# Patient Record
Sex: FEMALE | Race: WHITE | NOT HISPANIC OR LATINO | Employment: OTHER | URBAN - NONMETROPOLITAN AREA
[De-identification: names, ages, dates, MRNs, and addresses within clinical notes are randomized per-mention and may not be internally consistent; named-entity substitution may affect disease eponyms.]

---

## 2023-09-05 PROBLEM — R55 SYNCOPE: Status: ACTIVE | Noted: 2023-09-05

## 2023-09-05 PROBLEM — Z79.899 HIGH RISK MEDICATION USE: Status: ACTIVE | Noted: 2023-09-05

## 2023-09-05 PROBLEM — I48.20 CHRONIC ATRIAL FIBRILLATION (MULTI): Status: ACTIVE | Noted: 2023-09-05

## 2023-09-05 PROBLEM — J44.9 COPD (CHRONIC OBSTRUCTIVE PULMONARY DISEASE) (MULTI): Status: ACTIVE | Noted: 2023-09-05

## 2023-09-05 PROBLEM — Z79.01 ANTICOAGULATED: Status: ACTIVE | Noted: 2023-09-05

## 2023-09-05 PROBLEM — I47.20 PAROXYSMAL VENTRICULAR TACHYCARDIA: Status: ACTIVE | Noted: 2023-09-05

## 2023-09-05 PROBLEM — I10 ESSENTIAL HYPERTENSION: Status: ACTIVE | Noted: 2023-09-05

## 2023-09-05 PROBLEM — I47.29 PAROXYSMAL VENTRICULAR TACHYCARDIA (MULTI): Status: ACTIVE | Noted: 2023-09-05

## 2023-09-05 PROBLEM — Z87.891 FORMER SMOKER: Status: ACTIVE | Noted: 2023-09-05

## 2023-09-05 RX ORDER — DORZOLAMIDE HYDROCHLORIDE AND TIMOLOL MALEATE 20; 5 MG/ML; MG/ML
1 SOLUTION/ DROPS OPHTHALMIC 2 TIMES DAILY
COMMUNITY
Start: 2021-09-15

## 2023-09-05 RX ORDER — PREDNISOLONE ACETATE 10 MG/ML
SUSPENSION/ DROPS OPHTHALMIC
COMMUNITY
Start: 2023-03-22

## 2023-09-05 RX ORDER — METOPROLOL SUCCINATE 25 MG/1
1 TABLET, EXTENDED RELEASE ORAL DAILY
COMMUNITY
End: 2023-12-22 | Stop reason: SDUPTHER

## 2023-09-05 RX ORDER — WARFARIN 4 MG/1
4 TABLET ORAL
COMMUNITY
Start: 2023-04-22

## 2023-09-05 RX ORDER — BUDESONIDE 0.25 MG/2ML
0.25 INHALANT ORAL 2 TIMES DAILY
COMMUNITY
Start: 2022-02-18

## 2023-09-05 RX ORDER — IPRATROPIUM BROMIDE AND ALBUTEROL SULFATE 2.5; .5 MG/3ML; MG/3ML
3 SOLUTION RESPIRATORY (INHALATION) 3 TIMES DAILY
COMMUNITY
Start: 2022-02-18 | End: 2023-11-06

## 2023-09-05 RX ORDER — DENOSUMAB 60 MG/ML
INJECTION SUBCUTANEOUS
COMMUNITY

## 2023-09-05 RX ORDER — NITROGLYCERIN 0.4 MG/1
0.4 TABLET SUBLINGUAL EVERY 5 MIN PRN
COMMUNITY

## 2023-09-05 RX ORDER — AMLODIPINE BESYLATE 5 MG/1
5 TABLET ORAL
COMMUNITY
Start: 2023-01-31 | End: 2024-01-22 | Stop reason: SDUPTHER

## 2023-09-05 RX ORDER — MONTELUKAST SODIUM 10 MG/1
1 TABLET ORAL DAILY
COMMUNITY

## 2023-09-05 RX ORDER — ALBUTEROL SULFATE 0.83 MG/ML
2.5 SOLUTION RESPIRATORY (INHALATION) EVERY 6 HOURS PRN
COMMUNITY
Start: 2022-01-25

## 2023-09-05 RX ORDER — DIGOXIN 125 MCG
1 TABLET ORAL DAILY
COMMUNITY
End: 2023-11-15 | Stop reason: SDUPTHER

## 2023-09-05 RX ORDER — PREDNISONE 5 MG/1
1 TABLET ORAL DAILY
COMMUNITY
Start: 2021-12-16

## 2023-09-05 RX ORDER — VALSARTAN 80 MG/1
1 TABLET ORAL 2 TIMES DAILY
COMMUNITY
Start: 2021-07-28 | End: 2024-01-09 | Stop reason: SDUPTHER

## 2023-09-05 RX ORDER — CETIRIZINE HYDROCHLORIDE 10 MG/1
1 TABLET ORAL DAILY PRN
COMMUNITY

## 2023-11-06 ENCOUNTER — OFFICE VISIT (OUTPATIENT)
Dept: CARDIOLOGY | Facility: CLINIC | Age: 78
End: 2023-11-06
Payer: MEDICARE

## 2023-11-06 VITALS
HEART RATE: 88 BPM | SYSTOLIC BLOOD PRESSURE: 110 MMHG | HEIGHT: 68 IN | DIASTOLIC BLOOD PRESSURE: 80 MMHG | BODY MASS INDEX: 27.74 KG/M2 | WEIGHT: 183 LBS

## 2023-11-06 DIAGNOSIS — I48.20 CHRONIC ATRIAL FIBRILLATION (MULTI): ICD-10-CM

## 2023-11-06 DIAGNOSIS — J44.9 CHRONIC OBSTRUCTIVE PULMONARY DISEASE, UNSPECIFIED COPD TYPE (MULTI): ICD-10-CM

## 2023-11-06 DIAGNOSIS — I47.29 PAROXYSMAL VENTRICULAR TACHYCARDIA (MULTI): ICD-10-CM

## 2023-11-06 DIAGNOSIS — Z79.899 HIGH RISK MEDICATION USE: ICD-10-CM

## 2023-11-06 DIAGNOSIS — I10 ESSENTIAL HYPERTENSION: ICD-10-CM

## 2023-11-06 DIAGNOSIS — Z79.01 ANTICOAGULATED: ICD-10-CM

## 2023-11-06 PROCEDURE — 3079F DIAST BP 80-89 MM HG: CPT | Performed by: INTERNAL MEDICINE

## 2023-11-06 PROCEDURE — 3074F SYST BP LT 130 MM HG: CPT | Performed by: INTERNAL MEDICINE

## 2023-11-06 PROCEDURE — 1159F MED LIST DOCD IN RCRD: CPT | Performed by: INTERNAL MEDICINE

## 2023-11-06 PROCEDURE — 99214 OFFICE O/P EST MOD 30 MIN: CPT | Performed by: INTERNAL MEDICINE

## 2023-11-06 PROCEDURE — 1036F TOBACCO NON-USER: CPT | Performed by: INTERNAL MEDICINE

## 2023-11-06 RX ORDER — WARFARIN 1 MG/1
1 TABLET ORAL DAILY
COMMUNITY

## 2023-11-06 ASSESSMENT — ENCOUNTER SYMPTOMS: SHORTNESS OF BREATH: 1

## 2023-11-06 NOTE — PROGRESS NOTES
"Subjective   Debra Alicea is a 78 y.o. female       Chief Complaint    Follow-up          HPI   Patient is in the office for follow-up for the problems noted below.  She is doing great and the pressure finally is under great control with no side effect of medications.  She has chronic atrial fibrillation with controlled rate which remains asymptomatic and she is chronically anticoagulated without any bleeding problems.  There has been no syncope or presyncope.  She maintains active lifestyle and follows with her PCP on regular basis.  Her examination was only remarkable for irregular rhythm.    Assessment/recommendation:     1-history of syncope leading to injury, patient declined invasive evaluation with no recurrences. This happened over 3 year ago. Patient has no indication of organic heart disease.  2-hypertension currently under excellent control on medical regimen that she has tolerated fairly well  3-permanent atrial fibrillation managed with rate control and anticoagulation with warfarin, no thromboembolic events and no bleeding complications  4-high-risk medication with anticoagulants with no bleeding complications  5-mild COPD on medical therapy, patient follow-up with pulmonary medicine at the [facility]     Review of Systems   Respiratory:  Positive for shortness of breath.    All other systems reviewed and are negative.         Visit Vitals  /80 (BP Location: Left arm, Patient Position: Sitting)   Pulse 88   Ht 1.727 m (5' 8\")   Wt 83 kg (183 lb)   BMI 27.83 kg/m²   Smoking Status Former   BSA 2 m²        Objective   Physical Exam  Constitutional:       Appearance: Normal appearance. She is normal weight.   HENT:      Nose: Nose normal.   Neck:      Vascular: No carotid bruit.   Cardiovascular:      Rate and Rhythm: Normal rate. Rhythm irregular.      Pulses: Normal pulses.      Heart sounds: Normal heart sounds.   Pulmonary:      Effort: Pulmonary effort is normal.   Abdominal:      " General: Bowel sounds are normal.      Palpations: Abdomen is soft.   Genitourinary:     Rectum: Normal.   Musculoskeletal:         General: Normal range of motion.      Cervical back: Normal range of motion.      Right lower leg: No edema.      Left lower leg: No edema.   Skin:     General: Skin is warm and dry.   Neurological:      General: No focal deficit present.      Mental Status: She is alert.   Psychiatric:         Mood and Affect: Mood normal.         Behavior: Behavior normal.         Thought Content: Thought content normal.         Judgment: Judgment normal.         Current Medications    Current Outpatient Medications:     albuterol 2.5 mg /3 mL (0.083 %) nebulizer solution, Inhale 3 mL (2.5 mg) every 6 hours if needed., Disp: , Rfl:     amLODIPine (Norvasc) 5 mg tablet, 1 tablet (5 mg)., Disp: , Rfl:     budesonide (Pulmicort) 0.25 mg/2 mL nebulizer solution, Inhale 2 mL (0.25 mg) 2 times a day., Disp: , Rfl:     cetirizine (ZyrTEC) 10 mg tablet, Take 1 tablet (10 mg) by mouth once daily as needed., Disp: , Rfl:     denosumab (Prolia) 60 mg/mL syringe, Inject under the skin every 6 months., Disp: , Rfl:     digoxin (Lanoxin) 125 MCG tablet, Take 1 tablet (125 mcg) by mouth once daily., Disp: , Rfl:     dorzolamide-timoloL (Cosopt) 22.3-6.8 mg/mL ophthalmic solution, Administer 1 drop into the right eye 2 times a day., Disp: , Rfl:     metoprolol succinate XL (Toprol-XL) 25 mg 24 hr tablet, Take 1 tablet (25 mg) by mouth once daily., Disp: , Rfl:     montelukast (Singulair) 10 mg tablet, Take 1 tablet (10 mg) by mouth once daily., Disp: , Rfl:     nitroglycerin (Nitrostat) 0.4 mg SL tablet, Place 1 tablet (0.4 mg) under the tongue every 5 minutes if needed for chest pain., Disp: , Rfl:     prednisoLONE acetate (Pred-Forte) 1 % ophthalmic suspension, , Disp: , Rfl:     predniSONE (Deltasone) 5 mg tablet, Take 1 tablet (5 mg) by mouth once daily., Disp: , Rfl:     valsartan (Diovan) 80 mg tablet, Take 1  tablet (80 mg) by mouth 2 times a day., Disp: , Rfl:     warfarin (Coumadin) 1 mg tablet, Take 1 tablet (1 mg) by mouth once daily. Take as directed per After Visit Summary., Disp: , Rfl:     warfarin (Coumadin) 4 mg tablet, 1 tablet (4 mg)., Disp: , Rfl:                      Assessment/Plan   1. Chronic atrial fibrillation (CMS/HCC)  Basic Metabolic Panel    CBC    Follow Up In Cardiology      2. High risk medication use  Basic Metabolic Panel    CBC      3. Essential hypertension        4. Paroxysmal ventricular tachycardia (CMS/HCC)        5. Anticoagulated        6. Chronic obstructive pulmonary disease, unspecified COPD type (CMS/HCC)

## 2023-11-15 DIAGNOSIS — I47.29 PAROXYSMAL VENTRICULAR TACHYCARDIA (MULTI): ICD-10-CM

## 2023-11-15 RX ORDER — DIGOXIN 125 MCG
125 TABLET ORAL DAILY
Qty: 90 TABLET | Refills: 3 | Status: SHIPPED | OUTPATIENT
Start: 2023-11-15 | End: 2024-11-14

## 2023-12-22 DIAGNOSIS — I47.29 PAROXYSMAL VENTRICULAR TACHYCARDIA (MULTI): ICD-10-CM

## 2023-12-22 DIAGNOSIS — I10 ESSENTIAL HYPERTENSION: ICD-10-CM

## 2023-12-22 RX ORDER — METOPROLOL SUCCINATE 25 MG/1
25 TABLET, EXTENDED RELEASE ORAL DAILY
Qty: 90 TABLET | Refills: 3 | Status: SHIPPED | OUTPATIENT
Start: 2023-12-22 | End: 2024-12-21

## 2024-01-09 DIAGNOSIS — I10 ESSENTIAL HYPERTENSION: ICD-10-CM

## 2024-01-09 RX ORDER — VALSARTAN 80 MG/1
80 TABLET ORAL 2 TIMES DAILY
Qty: 180 TABLET | Refills: 3 | Status: SHIPPED | OUTPATIENT
Start: 2024-01-09

## 2024-01-22 DIAGNOSIS — I10 ESSENTIAL HYPERTENSION: ICD-10-CM

## 2024-01-22 RX ORDER — AMLODIPINE BESYLATE 5 MG/1
5 TABLET ORAL DAILY
Qty: 90 TABLET | Refills: 3 | Status: SHIPPED | OUTPATIENT
Start: 2024-01-22 | End: 2025-01-21

## 2024-05-07 ENCOUNTER — OFFICE VISIT (OUTPATIENT)
Dept: CARDIOLOGY | Facility: CLINIC | Age: 79
End: 2024-05-07
Payer: MEDICARE

## 2024-05-07 VITALS
WEIGHT: 143 LBS | SYSTOLIC BLOOD PRESSURE: 132 MMHG | HEIGHT: 68 IN | BODY MASS INDEX: 21.67 KG/M2 | HEART RATE: 74 BPM | DIASTOLIC BLOOD PRESSURE: 86 MMHG

## 2024-05-07 DIAGNOSIS — I10 ESSENTIAL HYPERTENSION: ICD-10-CM

## 2024-05-07 DIAGNOSIS — Z87.891 FORMER SMOKER: ICD-10-CM

## 2024-05-07 DIAGNOSIS — J44.9 CHRONIC OBSTRUCTIVE PULMONARY DISEASE, UNSPECIFIED COPD TYPE (MULTI): ICD-10-CM

## 2024-05-07 DIAGNOSIS — Z79.899 HIGH RISK MEDICATION USE: ICD-10-CM

## 2024-05-07 DIAGNOSIS — Z79.01 ANTICOAGULATED: ICD-10-CM

## 2024-05-07 DIAGNOSIS — I48.20 CHRONIC ATRIAL FIBRILLATION (MULTI): ICD-10-CM

## 2024-05-07 DIAGNOSIS — I47.29 PAROXYSMAL VENTRICULAR TACHYCARDIA (MULTI): ICD-10-CM

## 2024-05-07 PROCEDURE — 1159F MED LIST DOCD IN RCRD: CPT | Performed by: INTERNAL MEDICINE

## 2024-05-07 PROCEDURE — 3079F DIAST BP 80-89 MM HG: CPT | Performed by: INTERNAL MEDICINE

## 2024-05-07 PROCEDURE — 1036F TOBACCO NON-USER: CPT | Performed by: INTERNAL MEDICINE

## 2024-05-07 PROCEDURE — 99214 OFFICE O/P EST MOD 30 MIN: CPT | Performed by: INTERNAL MEDICINE

## 2024-05-07 PROCEDURE — 3075F SYST BP GE 130 - 139MM HG: CPT | Performed by: INTERNAL MEDICINE

## 2024-05-07 RX ORDER — FERROUS SULFATE, DRIED 160(50) MG
1 TABLET, EXTENDED RELEASE ORAL 2 TIMES DAILY
COMMUNITY

## 2024-05-07 RX ORDER — TIOTROPIUM BROMIDE AND OLODATEROL 3.124; 2.736 UG/1; UG/1
2 SPRAY, METERED RESPIRATORY (INHALATION) DAILY
COMMUNITY

## 2024-05-07 RX ORDER — THIAMINE HCL 250 MG
250 TABLET ORAL DAILY
COMMUNITY

## 2024-05-07 NOTE — PROGRESS NOTES
"Subjective   Debra Alicea is a 78 y.o. female       Chief Complaint    Follow-up          HPI   Patient is in the office for follow-up for the problems noted below.  Since her last visit she lost 10 pounds on purpose and was advised not to lose any more weight.  She has maintained active lifestyle and has been highly motivated.  She sees pulmonary medicine at the [facility] for her dyspnea caused by COPD and seasonal reactive airway disease.  She had no syncope and no palpitations and no issues with her blood pressure.  Lab data available in the chart were reviewed and there was no concern noted.  Examination was remarkable for diminished breath sounds in her lungs.    Assessment/recommendation:     1-history of syncope leading to injury, patient declined invasive evaluation with no recurrences. This happened over 3 year ago. Patient has no indication of organic heart disease.  2-hypertension currently under excellent control on medical regimen that she has tolerated fairly well  3-permanent atrial fibrillation managed with rate control and anticoagulation with warfarin, no thromboembolic events and no bleeding complications  4-high-risk medication with anticoagulants with no bleeding complications  5-mild COPD on medical therapy, patient follow-up with pulmonary medicine at the [facility]  Review of Systems   All other systems reviewed and are negative.           Vitals:    05/07/24 1101   BP: 132/86   BP Location: Right arm   Patient Position: Sitting   Pulse: 74   Weight: 64.9 kg (143 lb)   Height: 1.727 m (5' 8\")        Objective   Physical Exam  Constitutional:       Appearance: Normal appearance.   HENT:      Nose: Nose normal.   Neck:      Vascular: No carotid bruit.   Cardiovascular:      Rate and Rhythm: Normal rate. Rhythm irregularly irregular.      Pulses: Normal pulses.      Heart sounds: Normal heart sounds.   Pulmonary:      Effort: Pulmonary effort is normal.      Breath sounds: " Decreased air movement present.   Abdominal:      General: Bowel sounds are normal.      Palpations: Abdomen is soft.   Musculoskeletal:         General: Normal range of motion.      Cervical back: Normal range of motion.      Right lower leg: No edema.      Left lower leg: No edema.   Skin:     General: Skin is warm and dry.   Neurological:      General: No focal deficit present.      Mental Status: She is alert.   Psychiatric:         Mood and Affect: Mood normal.         Behavior: Behavior normal.         Thought Content: Thought content normal.         Judgment: Judgment normal.         Allergies  Patient has no known allergies.     Current Medications    Current Outpatient Medications:     albuterol 2.5 mg /3 mL (0.083 %) nebulizer solution, Inhale 3 mL (2.5 mg) every 6 hours if needed., Disp: , Rfl:     amLODIPine (Norvasc) 5 mg tablet, Take 1 tablet (5 mg) by mouth once daily., Disp: 90 tablet, Rfl: 3    budesonide (Pulmicort) 0.25 mg/2 mL nebulizer solution, Inhale 2 mL (0.25 mg) 2 times a day., Disp: , Rfl:     calcium carbonate-vitamin D3 500 mg-5 mcg (200 unit) tablet, Take 1 tablet by mouth 2 times a day., Disp: , Rfl:     cetirizine (ZyrTEC) 10 mg tablet, Take 1 tablet (10 mg) by mouth once daily as needed., Disp: , Rfl:     denosumab (Prolia) 60 mg/mL syringe, Inject under the skin every 6 months., Disp: , Rfl:     digoxin (Lanoxin) 125 MCG tablet, Take 1 tablet (125 mcg) by mouth once daily., Disp: 90 tablet, Rfl: 3    diphenhydramine HCl (BENADRYL ALLERGY ORAL), Take 1 tablet by mouth once daily at bedtime., Disp: , Rfl:     dorzolamide-timoloL (Cosopt) 22.3-6.8 mg/mL ophthalmic solution, Administer 1 drop into the right eye 2 times a day., Disp: , Rfl:     metoprolol succinate XL (Toprol-XL) 25 mg 24 hr tablet, Take 1 tablet (25 mg) by mouth once daily., Disp: 90 tablet, Rfl: 3    montelukast (Singulair) 10 mg tablet, Take 1 tablet (10 mg) by mouth once daily., Disp: , Rfl:     nitroglycerin  (Nitrostat) 0.4 mg SL tablet, Place 1 tablet (0.4 mg) under the tongue every 5 minutes if needed for chest pain., Disp: , Rfl:     prednisoLONE acetate (Pred-Forte) 1 % ophthalmic suspension, , Disp: , Rfl:     predniSONE (Deltasone) 5 mg tablet, Take 1 tablet (5 mg) by mouth once daily., Disp: , Rfl:     thiamine (Vitamin B-1) 250 mg tablet, Take 1 tablet (250 mg) by mouth once daily., Disp: , Rfl:     tiotropium-olodateroL (Stiolto Respimat) 2.5-2.5 mcg/actuation mist inhaler, Inhale 2 Inhalations once daily., Disp: , Rfl:     valsartan (Diovan) 80 mg tablet, Take 1 tablet (80 mg) by mouth 2 times a day., Disp: 180 tablet, Rfl: 3    warfarin (Coumadin) 1 mg tablet, Take 1 tablet (1 mg) by mouth once daily. As directed by [facility], Disp: , Rfl:     warfarin (Coumadin) 4 mg tablet, 1 tablet (4 mg). As directed by [facility], Disp: , Rfl:                      Assessment/Plan   1. Chronic atrial fibrillation (Multi)  Follow Up In Cardiology    Follow Up In Cardiology    CBC    Basic Metabolic Panel    CBC    Basic Metabolic Panel      2. Essential hypertension        3. Paroxysmal ventricular tachycardia (Multi)        4. High risk medication use        5. Anticoagulated        6. Chronic obstructive pulmonary disease, unspecified COPD type (Multi)        7. Former smoker                 Scribe Attestation  By signing my name below, Siri AHUJA LPN, Scribe   attest that this documentation has been prepared under the direction and in the presence of Joe Hobbs MD.     Provider Attestation - Scribe documentation    All medical record entries made by the Scribe were at my direction and personally dictated by me. I have reviewed the chart and agree that the record accurately reflects my personal performance of the history, physical exam, discussion and plan.

## 2024-05-07 NOTE — PATIENT INSTRUCTIONS
Please bring all medicines, vitamins, and herbal supplements with you when you come to the office.    Prescriptions will not be filled unless you are compliant with your follow up appointments or have a follow up appointment scheduled as per instruction of your physician. Refills should be requested at the time of your visit.     Lab work  6 month follow up

## 2024-05-07 NOTE — LETTER
May 7, 2024     Jessica Smyth, APRN-CNP  [address] [facility] [address]    Patient: Debra Alicea   YOB: 1945   Date of Visit: 5/7/2024       Dear Dr. Jessica Smyth, APRN-CNP:    Thank you for referring Debra Alicea to me for evaluation. Below are my notes for this consultation.  If you have questions, please do not hesitate to call me. I look forward to following your patient along with you.       Sincerely,     Joe Hobbs MD      CC: No Recipients  ______________________________________________________________________________________    Subjective   Debra Alicea is a 78 y.o. female       Chief Complaint    Follow-up          HPI   Patient is in the office for follow-up for the problems noted below.  Since her last visit she lost 10 pounds on purpose and was advised not to lose any more weight.  She has maintained active lifestyle and has been highly motivated.  She sees pulmonary medicine at the [facility] for her dyspnea caused by COPD and seasonal reactive airway disease.  She had no syncope and no palpitations and no issues with her blood pressure.  Lab data available in the chart were reviewed and there was no concern noted.  Examination was remarkable for diminished breath sounds in her lungs.    Assessment/recommendation:     1-history of syncope leading to injury, patient declined invasive evaluation with no recurrences. This happened over 3 year ago. Patient has no indication of organic heart disease.  2-hypertension currently under excellent control on medical regimen that she has tolerated fairly well  3-permanent atrial fibrillation managed with rate control and anticoagulation with warfarin, no thromboembolic events and no bleeding complications  4-high-risk medication with anticoagulants with no bleeding complications  5-mild COPD on medical therapy, patient follow-up with pulmonary medicine at the [facility]  Review of Systems   All other  "systems reviewed and are negative.           Vitals:    05/07/24 1101   BP: 132/86   BP Location: Right arm   Patient Position: Sitting   Pulse: 74   Weight: 64.9 kg (143 lb)   Height: 1.727 m (5' 8\")        Objective   Physical Exam  Constitutional:       Appearance: Normal appearance.   HENT:      Nose: Nose normal.   Neck:      Vascular: No carotid bruit.   Cardiovascular:      Rate and Rhythm: Normal rate. Rhythm irregularly irregular.      Pulses: Normal pulses.      Heart sounds: Normal heart sounds.   Pulmonary:      Effort: Pulmonary effort is normal.      Breath sounds: Decreased air movement present.   Abdominal:      General: Bowel sounds are normal.      Palpations: Abdomen is soft.   Musculoskeletal:         General: Normal range of motion.      Cervical back: Normal range of motion.      Right lower leg: No edema.      Left lower leg: No edema.   Skin:     General: Skin is warm and dry.   Neurological:      General: No focal deficit present.      Mental Status: She is alert.   Psychiatric:         Mood and Affect: Mood normal.         Behavior: Behavior normal.         Thought Content: Thought content normal.         Judgment: Judgment normal.         Allergies  Patient has no known allergies.     Current Medications    Current Outpatient Medications:   •  albuterol 2.5 mg /3 mL (0.083 %) nebulizer solution, Inhale 3 mL (2.5 mg) every 6 hours if needed., Disp: , Rfl:   •  amLODIPine (Norvasc) 5 mg tablet, Take 1 tablet (5 mg) by mouth once daily., Disp: 90 tablet, Rfl: 3  •  budesonide (Pulmicort) 0.25 mg/2 mL nebulizer solution, Inhale 2 mL (0.25 mg) 2 times a day., Disp: , Rfl:   •  calcium carbonate-vitamin D3 500 mg-5 mcg (200 unit) tablet, Take 1 tablet by mouth 2 times a day., Disp: , Rfl:   •  cetirizine (ZyrTEC) 10 mg tablet, Take 1 tablet (10 mg) by mouth once daily as needed., Disp: , Rfl:   •  denosumab (Prolia) 60 mg/mL syringe, Inject under the skin every 6 months., Disp: , Rfl:   •  " digoxin (Lanoxin) 125 MCG tablet, Take 1 tablet (125 mcg) by mouth once daily., Disp: 90 tablet, Rfl: 3  •  diphenhydramine HCl (BENADRYL ALLERGY ORAL), Take 1 tablet by mouth once daily at bedtime., Disp: , Rfl:   •  dorzolamide-timoloL (Cosopt) 22.3-6.8 mg/mL ophthalmic solution, Administer 1 drop into the right eye 2 times a day., Disp: , Rfl:   •  metoprolol succinate XL (Toprol-XL) 25 mg 24 hr tablet, Take 1 tablet (25 mg) by mouth once daily., Disp: 90 tablet, Rfl: 3  •  montelukast (Singulair) 10 mg tablet, Take 1 tablet (10 mg) by mouth once daily., Disp: , Rfl:   •  nitroglycerin (Nitrostat) 0.4 mg SL tablet, Place 1 tablet (0.4 mg) under the tongue every 5 minutes if needed for chest pain., Disp: , Rfl:   •  prednisoLONE acetate (Pred-Forte) 1 % ophthalmic suspension, , Disp: , Rfl:   •  predniSONE (Deltasone) 5 mg tablet, Take 1 tablet (5 mg) by mouth once daily., Disp: , Rfl:   •  thiamine (Vitamin B-1) 250 mg tablet, Take 1 tablet (250 mg) by mouth once daily., Disp: , Rfl:   •  tiotropium-olodateroL (Stiolto Respimat) 2.5-2.5 mcg/actuation mist inhaler, Inhale 2 Inhalations once daily., Disp: , Rfl:   •  valsartan (Diovan) 80 mg tablet, Take 1 tablet (80 mg) by mouth 2 times a day., Disp: 180 tablet, Rfl: 3  •  warfarin (Coumadin) 1 mg tablet, Take 1 tablet (1 mg) by mouth once daily. As directed by [facility], Disp: , Rfl:   •  warfarin (Coumadin) 4 mg tablet, 1 tablet (4 mg). As directed by [facility], Disp: , Rfl:                      Assessment/Plan   1. Chronic atrial fibrillation (Multi)  Follow Up In Cardiology    Follow Up In Cardiology    CBC    Basic Metabolic Panel    CBC    Basic Metabolic Panel      2. Essential hypertension        3. Paroxysmal ventricular tachycardia (Multi)        4. High risk medication use        5. Anticoagulated        6. Chronic obstructive pulmonary disease, unspecified COPD type (Multi)        7. Former smoker                 Scribe  Attestation  By signing my name below, I, Siri MCDONOUGH LPN, Scribe   attest that this documentation has been prepared under the direction and in the presence of Joe Hobbs MD.     Provider Attestation - Scribe documentation    All medical record entries made by the Scribe were at my direction and personally dictated by me. I have reviewed the chart and agree that the record accurately reflects my personal performance of the history, physical exam, discussion and plan.

## 2024-10-10 ENCOUNTER — TELEPHONE (OUTPATIENT)
Dept: CARDIOLOGY | Facility: CLINIC | Age: 79
End: 2024-10-10
Payer: MEDICARE

## 2024-10-10 NOTE — TELEPHONE ENCOUNTER
----- Message from Joe Hobbs sent at 10/9/2024 10:40 PM EDT -----  Stop coumadin 5 days prior, then proceed  ----- Message -----  From: Susanna Martin  Sent: 10/9/2024   3:03 PM EDT  To: Joe Hobbs MD

## 2024-10-28 DIAGNOSIS — I47.29 PAROXYSMAL VENTRICULAR TACHYCARDIA (MULTI): ICD-10-CM

## 2024-10-28 RX ORDER — DIGOXIN 125 MCG
125 TABLET ORAL DAILY
Qty: 90 TABLET | Refills: 3 | Status: SHIPPED | OUTPATIENT
Start: 2024-10-28 | End: 2025-10-28

## 2024-12-04 ENCOUNTER — APPOINTMENT (OUTPATIENT)
Dept: CARDIOLOGY | Facility: CLINIC | Age: 79
End: 2024-12-04
Payer: MEDICARE

## 2024-12-04 VITALS
SYSTOLIC BLOOD PRESSURE: 134 MMHG | HEART RATE: 72 BPM | HEIGHT: 68 IN | BODY MASS INDEX: 20 KG/M2 | WEIGHT: 132 LBS | DIASTOLIC BLOOD PRESSURE: 86 MMHG

## 2024-12-04 DIAGNOSIS — I48.21 PERMANENT ATRIAL FIBRILLATION (MULTI): Primary | ICD-10-CM

## 2024-12-04 DIAGNOSIS — Z79.01 ANTICOAGULATED: ICD-10-CM

## 2024-12-04 DIAGNOSIS — Z79.899 HIGH RISK MEDICATION USE: ICD-10-CM

## 2024-12-04 DIAGNOSIS — Z87.891 FORMER SMOKER: ICD-10-CM

## 2024-12-04 DIAGNOSIS — I10 ESSENTIAL HYPERTENSION: ICD-10-CM

## 2024-12-04 DIAGNOSIS — J44.9 CHRONIC OBSTRUCTIVE PULMONARY DISEASE, UNSPECIFIED COPD TYPE (MULTI): ICD-10-CM

## 2024-12-04 PROCEDURE — 1159F MED LIST DOCD IN RCRD: CPT | Performed by: INTERNAL MEDICINE

## 2024-12-04 PROCEDURE — 3079F DIAST BP 80-89 MM HG: CPT | Performed by: INTERNAL MEDICINE

## 2024-12-04 PROCEDURE — 1036F TOBACCO NON-USER: CPT | Performed by: INTERNAL MEDICINE

## 2024-12-04 PROCEDURE — 99214 OFFICE O/P EST MOD 30 MIN: CPT | Performed by: INTERNAL MEDICINE

## 2024-12-04 PROCEDURE — 3075F SYST BP GE 130 - 139MM HG: CPT | Performed by: INTERNAL MEDICINE

## 2024-12-04 RX ORDER — METOPROLOL SUCCINATE 25 MG/1
25 TABLET, EXTENDED RELEASE ORAL DAILY
Qty: 90 TABLET | Refills: 3 | Status: SHIPPED | OUTPATIENT
Start: 2024-12-04 | End: 2025-12-04

## 2024-12-04 RX ORDER — VALSARTAN 80 MG/1
80 TABLET ORAL 2 TIMES DAILY
Qty: 180 TABLET | Refills: 3 | Status: SHIPPED | OUTPATIENT
Start: 2024-12-04

## 2024-12-04 RX ORDER — AMLODIPINE BESYLATE 5 MG/1
5 TABLET ORAL DAILY
Qty: 90 TABLET | Refills: 3 | Status: SHIPPED | OUTPATIENT
Start: 2024-12-04 | End: 2025-12-04

## 2024-12-04 ASSESSMENT — ENCOUNTER SYMPTOMS
SHORTNESS OF BREATH: 1
LIGHT-HEADEDNESS: 1

## 2024-12-04 NOTE — LETTER
December 4, 2024     Jessica Smyth, APRN-CNP  [address] [facility] [address]    Patient: Debra Alicea   YOB: 1945   Date of Visit: 12/4/2024       Dear Dr. Jessica Smyth, APRN-CNP:    Thank you for referring Debra Alicea to me for evaluation. Below are my notes for this consultation.  If you have questions, please do not hesitate to call me. I look forward to following your patient along with you.       Sincerely,     Joe Hobbs MD      CC: No Recipients  ______________________________________________________________________________________    Subjective   Debra Alicea is a 79 y.o. female       Chief Complaint    Follow-up          HPI   Patient is in the office for follow-up for the problems noted below.  She has no complaints of palpitations or dyspnea.  She has no orthopnea PND lower extremity edema.  She has no bleeding complications while she is on Coumadin that she takes for her chronic atrial fibrillation.  Her lab data from recent testing were reviewed.  This was shared with the patient and I did not have any concern.  She continue to follow with pulmonary medicine at the [facility] for COPD from previous tobacco abuse.  She maintains active lifestyle.  The only physical examination abnormality was a regular rhythm and the pulmonary abnormalities noted below.    Assessment/recommendation:     1-history of syncope leading to injury, patient declined invasive evaluation with no recurrences. This happened over 3 year ago. Patient has no indication of organic heart disease.  2-essential hypertension currently under excellent control on medical regimen that she has tolerated fairly well  3-permanent atrial fibrillation managed with rate control and anticoagulation with warfarin, no thromboembolic events and no bleeding complications  4-high-risk medication with anticoagulants with no bleeding complications  5-mild COPD on medical therapy, patient follow-up with pulmonary  "medicine at the [facility]  Review of Systems   Respiratory:  Positive for shortness of breath.    Neurological:  Positive for light-headedness.   All other systems reviewed and are negative.           Vitals:    12/04/24 1021   BP: 134/86   BP Location: Left arm   Patient Position: Sitting   Pulse: 72   Weight: 59.9 kg (132 lb)   Height: 1.727 m (5' 8\")        Objective   Physical Exam  Constitutional:       Appearance: Normal appearance.   HENT:      Nose: Nose normal.   Neck:      Vascular: No carotid bruit.   Cardiovascular:      Rate and Rhythm: Normal rate. Rhythm irregularly irregular.      Pulses: Normal pulses.      Heart sounds: Normal heart sounds.   Pulmonary:      Effort: Pulmonary effort is normal.      Comments: Rivas crackles  Abdominal:      General: Bowel sounds are normal.      Palpations: Abdomen is soft.   Musculoskeletal:         General: Normal range of motion.      Cervical back: Normal range of motion.      Right lower leg: No edema.      Left lower leg: No edema.   Skin:     General: Skin is warm and dry.   Neurological:      General: No focal deficit present.      Mental Status: She is alert.   Psychiatric:         Mood and Affect: Mood normal.         Behavior: Behavior normal.         Thought Content: Thought content normal.         Judgment: Judgment normal.         Allergies  Patient has no known allergies.     Current Medications    Current Outpatient Medications:   •  albuterol 2.5 mg /3 mL (0.083 %) nebulizer solution, Inhale 3 mL (2.5 mg) every 6 hours if needed., Disp: , Rfl:   •  amLODIPine (Norvasc) 5 mg tablet, Take 1 tablet (5 mg) by mouth once daily., Disp: 90 tablet, Rfl: 3  •  budesonide (Pulmicort) 0.25 mg/2 mL nebulizer solution, Inhale 2 mL (0.25 mg) 2 times a day., Disp: , Rfl:   •  calcium carbonate-vitamin D3 500 mg-5 mcg (200 unit) tablet, Take 1 tablet by mouth 2 times a day., Disp: , Rfl:   •  cetirizine (ZyrTEC) 10 mg tablet, Take 1 tablet (10 mg) by mouth " once daily as needed., Disp: , Rfl:   •  denosumab (Prolia) 60 mg/mL syringe, Inject under the skin every 6 months., Disp: , Rfl:   •  digoxin (Lanoxin) 125 MCG tablet, Take 1 tablet (125 mcg) by mouth once daily., Disp: 90 tablet, Rfl: 3  •  diphenhydramine HCl (BENADRYL ALLERGY ORAL), Take 1 tablet by mouth once daily at bedtime., Disp: , Rfl:   •  dorzolamide-timoloL (Cosopt) 22.3-6.8 mg/mL ophthalmic solution, Administer 1 drop into the right eye 2 times a day., Disp: , Rfl:   •  metoprolol succinate XL (Toprol-XL) 25 mg 24 hr tablet, Take 1 tablet (25 mg) by mouth once daily., Disp: 90 tablet, Rfl: 3  •  montelukast (Singulair) 10 mg tablet, Take 1 tablet (10 mg) by mouth once daily., Disp: , Rfl:   •  nitroglycerin (Nitrostat) 0.4 mg SL tablet, Place 1 tablet (0.4 mg) under the tongue every 5 minutes if needed for chest pain., Disp: , Rfl:   •  prednisoLONE acetate (Pred-Forte) 1 % ophthalmic suspension, , Disp: , Rfl:   •  predniSONE (Deltasone) 5 mg tablet, Take 1 tablet (5 mg) by mouth once daily., Disp: , Rfl:   •  thiamine (Vitamin B-1) 250 mg tablet, Take 1 tablet (250 mg) by mouth once daily., Disp: , Rfl:   •  tiotropium-olodateroL (Stiolto Respimat) 2.5-2.5 mcg/actuation mist inhaler, Inhale 2 Inhalations once daily., Disp: , Rfl:   •  valsartan (Diovan) 80 mg tablet, Take 1 tablet (80 mg) by mouth 2 times a day., Disp: 180 tablet, Rfl: 3  •  warfarin (Coumadin) 4 mg tablet, 1 tablet (4 mg). As directed by [facility], Disp: , Rfl:                      Assessment/Plan   1. Permanent atrial fibrillation (Multi)  Follow Up In Cardiology    Follow Up In Cardiology      2. Essential hypertension  amLODIPine (Norvasc) 5 mg tablet    metoprolol succinate XL (Toprol-XL) 25 mg 24 hr tablet    valsartan (Diovan) 80 mg tablet      3. Anticoagulated        4. High risk medication use        5. Former smoker        6. BMI 20.0-20.9, adult        7. Chronic obstructive pulmonary disease, unspecified COPD  type (Multi)                 Scribe Attestation  By signing my name below, ISiri LPN, Scribe   attest that this documentation has been prepared under the direction and in the presence of Joe Hobbs MD.     Provider Attestation - Scribe documentation    All medical record entries made by the Scribe were at my direction and personally dictated by me. I have reviewed the chart and agree that the record accurately reflects my personal performance of the history, physical exam, discussion and plan.

## 2024-12-04 NOTE — PROGRESS NOTES
"Subjective   Debra Alicea is a 79 y.o. female       Chief Complaint    Follow-up          HPI   Patient is in the office for follow-up for the problems noted below.  She has no complaints of palpitations or dyspnea.  She has no orthopnea PND lower extremity edema.  She has no bleeding complications while she is on Coumadin that she takes for her chronic atrial fibrillation.  Her lab data from recent testing were reviewed.  This was shared with the patient and I did not have any concern.  She continue to follow with pulmonary medicine at the [facility] for COPD from previous tobacco abuse.  She maintains active lifestyle.  The only physical examination abnormality was a regular rhythm and the pulmonary abnormalities noted below.    Assessment/recommendation:     1-history of syncope leading to injury, patient declined invasive evaluation with no recurrences. This happened over 3 year ago. Patient has no indication of organic heart disease.  2-essential hypertension currently under excellent control on medical regimen that she has tolerated fairly well  3-permanent atrial fibrillation managed with rate control and anticoagulation with warfarin, no thromboembolic events and no bleeding complications  4-high-risk medication with anticoagulants with no bleeding complications  5-mild COPD on medical therapy, patient follow-up with pulmonary medicine at the [facility]  Review of Systems   Respiratory:  Positive for shortness of breath.    Neurological:  Positive for light-headedness.   All other systems reviewed and are negative.           Vitals:    12/04/24 1021   BP: 134/86   BP Location: Left arm   Patient Position: Sitting   Pulse: 72   Weight: 59.9 kg (132 lb)   Height: 1.727 m (5' 8\")        Objective   Physical Exam  Constitutional:       Appearance: Normal appearance.   HENT:      Nose: Nose normal.   Neck:      Vascular: No carotid bruit.   Cardiovascular:      Rate and Rhythm: Normal rate. Rhythm " irregularly irregular.      Pulses: Normal pulses.      Heart sounds: Normal heart sounds.   Pulmonary:      Effort: Pulmonary effort is normal.      Comments: Rivas crackles  Abdominal:      General: Bowel sounds are normal.      Palpations: Abdomen is soft.   Musculoskeletal:         General: Normal range of motion.      Cervical back: Normal range of motion.      Right lower leg: No edema.      Left lower leg: No edema.   Skin:     General: Skin is warm and dry.   Neurological:      General: No focal deficit present.      Mental Status: She is alert.   Psychiatric:         Mood and Affect: Mood normal.         Behavior: Behavior normal.         Thought Content: Thought content normal.         Judgment: Judgment normal.         Allergies  Patient has no known allergies.     Current Medications    Current Outpatient Medications:     albuterol 2.5 mg /3 mL (0.083 %) nebulizer solution, Inhale 3 mL (2.5 mg) every 6 hours if needed., Disp: , Rfl:     amLODIPine (Norvasc) 5 mg tablet, Take 1 tablet (5 mg) by mouth once daily., Disp: 90 tablet, Rfl: 3    budesonide (Pulmicort) 0.25 mg/2 mL nebulizer solution, Inhale 2 mL (0.25 mg) 2 times a day., Disp: , Rfl:     calcium carbonate-vitamin D3 500 mg-5 mcg (200 unit) tablet, Take 1 tablet by mouth 2 times a day., Disp: , Rfl:     cetirizine (ZyrTEC) 10 mg tablet, Take 1 tablet (10 mg) by mouth once daily as needed., Disp: , Rfl:     denosumab (Prolia) 60 mg/mL syringe, Inject under the skin every 6 months., Disp: , Rfl:     digoxin (Lanoxin) 125 MCG tablet, Take 1 tablet (125 mcg) by mouth once daily., Disp: 90 tablet, Rfl: 3    diphenhydramine HCl (BENADRYL ALLERGY ORAL), Take 1 tablet by mouth once daily at bedtime., Disp: , Rfl:     dorzolamide-timoloL (Cosopt) 22.3-6.8 mg/mL ophthalmic solution, Administer 1 drop into the right eye 2 times a day., Disp: , Rfl:     metoprolol succinate XL (Toprol-XL) 25 mg 24 hr tablet, Take 1 tablet (25 mg) by mouth once daily., Disp:  90 tablet, Rfl: 3    montelukast (Singulair) 10 mg tablet, Take 1 tablet (10 mg) by mouth once daily., Disp: , Rfl:     nitroglycerin (Nitrostat) 0.4 mg SL tablet, Place 1 tablet (0.4 mg) under the tongue every 5 minutes if needed for chest pain., Disp: , Rfl:     prednisoLONE acetate (Pred-Forte) 1 % ophthalmic suspension, , Disp: , Rfl:     predniSONE (Deltasone) 5 mg tablet, Take 1 tablet (5 mg) by mouth once daily., Disp: , Rfl:     thiamine (Vitamin B-1) 250 mg tablet, Take 1 tablet (250 mg) by mouth once daily., Disp: , Rfl:     tiotropium-olodateroL (Stiolto Respimat) 2.5-2.5 mcg/actuation mist inhaler, Inhale 2 Inhalations once daily., Disp: , Rfl:     valsartan (Diovan) 80 mg tablet, Take 1 tablet (80 mg) by mouth 2 times a day., Disp: 180 tablet, Rfl: 3    warfarin (Coumadin) 4 mg tablet, 1 tablet (4 mg). As directed by [facility], Disp: , Rfl:                      Assessment/Plan   1. Permanent atrial fibrillation (Multi)  Follow Up In Cardiology    Follow Up In Cardiology      2. Essential hypertension  amLODIPine (Norvasc) 5 mg tablet    metoprolol succinate XL (Toprol-XL) 25 mg 24 hr tablet    valsartan (Diovan) 80 mg tablet      3. Anticoagulated        4. High risk medication use        5. Former smoker        6. BMI 20.0-20.9, adult        7. Chronic obstructive pulmonary disease, unspecified COPD type (Multi)                 Scribe Attestation  By signing my name below, Siri AHUJA LPN, Scribe   attest that this documentation has been prepared under the direction and in the presence of Joe Hobbs MD.     Provider Attestation - Scribe documentation    All medical record entries made by the Scribe were at my direction and personally dictated by me. I have reviewed the chart and agree that the record accurately reflects my personal performance of the history, physical exam, discussion and plan.

## 2025-07-16 ENCOUNTER — APPOINTMENT (OUTPATIENT)
Dept: CARDIOLOGY | Facility: CLINIC | Age: 80
End: 2025-07-16
Payer: MEDICARE

## 2025-07-16 VITALS
HEART RATE: 82 BPM | DIASTOLIC BLOOD PRESSURE: 62 MMHG | HEIGHT: 68 IN | SYSTOLIC BLOOD PRESSURE: 116 MMHG | BODY MASS INDEX: 19.22 KG/M2 | WEIGHT: 126.8 LBS

## 2025-07-16 DIAGNOSIS — Z79.01 ANTICOAGULATED: ICD-10-CM

## 2025-07-16 DIAGNOSIS — I48.21 PERMANENT ATRIAL FIBRILLATION (MULTI): Primary | ICD-10-CM

## 2025-07-16 DIAGNOSIS — I10 ESSENTIAL HYPERTENSION: ICD-10-CM

## 2025-07-16 DIAGNOSIS — Z79.899 HIGH RISK MEDICATION USE: ICD-10-CM

## 2025-07-16 DIAGNOSIS — Z87.891 FORMER SMOKER: ICD-10-CM

## 2025-07-16 DIAGNOSIS — J44.9 CHRONIC OBSTRUCTIVE PULMONARY DISEASE, UNSPECIFIED COPD TYPE (MULTI): ICD-10-CM

## 2025-07-16 DIAGNOSIS — Z87.898 HISTORY OF SYNCOPE: ICD-10-CM

## 2025-07-16 PROCEDURE — 99214 OFFICE O/P EST MOD 30 MIN: CPT | Performed by: INTERNAL MEDICINE

## 2025-07-16 PROCEDURE — 3078F DIAST BP <80 MM HG: CPT | Performed by: INTERNAL MEDICINE

## 2025-07-16 PROCEDURE — G2211 COMPLEX E/M VISIT ADD ON: HCPCS | Performed by: INTERNAL MEDICINE

## 2025-07-16 PROCEDURE — 3074F SYST BP LT 130 MM HG: CPT | Performed by: INTERNAL MEDICINE

## 2025-07-16 PROCEDURE — 1159F MED LIST DOCD IN RCRD: CPT | Performed by: INTERNAL MEDICINE

## 2025-07-16 RX ORDER — IPRATROPIUM BROMIDE 0.5 MG/2.5ML
0.5 SOLUTION RESPIRATORY (INHALATION) 2 TIMES DAILY PRN
COMMUNITY

## 2025-07-16 RX ORDER — METOPROLOL SUCCINATE 25 MG/1
25 TABLET, EXTENDED RELEASE ORAL DAILY
Qty: 90 TABLET | Refills: 3 | Status: SHIPPED | OUTPATIENT
Start: 2025-07-16 | End: 2026-07-16

## 2025-07-16 RX ORDER — AMLODIPINE BESYLATE 5 MG/1
5 TABLET ORAL DAILY
Qty: 90 TABLET | Refills: 3 | Status: SHIPPED | OUTPATIENT
Start: 2025-07-16 | End: 2026-07-16

## 2025-07-16 RX ORDER — FLUTICASONE PROPIONATE 50 MCG
1 SPRAY, SUSPENSION (ML) NASAL DAILY PRN
COMMUNITY

## 2025-07-16 RX ORDER — UMECLIDINIUM BROMIDE AND VILANTEROL TRIFENATATE 62.5; 25 UG/1; UG/1
1 POWDER RESPIRATORY (INHALATION) DAILY
COMMUNITY

## 2025-07-16 RX ORDER — VALSARTAN 80 MG/1
80 TABLET ORAL 2 TIMES DAILY
Qty: 180 TABLET | Refills: 3 | Status: SHIPPED | OUTPATIENT
Start: 2025-07-16

## 2025-07-16 NOTE — LETTER
July 16, 2025     Jessica Smyth, APRN-CNP  [address] [address]    Patient: Debra Alicea   YOB: 1945   Date of Visit: 7/16/2025       Dear Dr. Jessica Smyth, APRN-CNP:    Thank you for referring Debra Alicea to me for evaluation. Below are my notes for this consultation.  If you have questions, please do not hesitate to call me. I look forward to following your patient along with you.       Sincerely,     Joe Hobbs MD      CC: No Recipients  ______________________________________________________________________________________    HPI  Patient is in the office for follow-up for permanent atrial fibrillation which has been treated with rate control and anticoagulation with warfarin.  The patient has had no cardiac events since her last visit, her INR has been checked periodically at the hospital and has been therapeutic for the most part.  Her last blood work from October of last year which I reviewed with her [facility] metabolic profile and CBC and she is due for repeat testing.  She reports no bleeding complications or thromboembolic events.  She sees pulmonary medicine at the [facility] and her asthma or reactive airway disease has been stable.  She is on inhaler therapy and prednisone as well.  Her examination today was essentially remarkable for irregular rhythm outside of that everything was normal.    Assessment/recommendation:     1-history of syncope leading to injury, patient declined invasive evaluation with no recurrences. This happened over 4 year ago. Patient has no indication of organic heart disease.  2-essential hypertension currently under excellent control on medical regimen that consists of amlodipine, metoprolol and valsartan that she has tolerated fairly well, she follows low-salt diet has ideal body weight and has been compliant with medical therapy.  Patient is due for basic metabolic profile which is ordered she is known to have normal kidney  "function  3-permanent atrial fibrillation managed with rate control and anticoagulation with warfarin, no thromboembolic events and no bleeding complications, patient is due for CBC which is ordered  4-high-risk medication with anticoagulants with no bleeding complications  5-mild COPD on medical therapy, patient follow-up with pulmonary medicine at the [facility] currently on small dose of steroids  ROS   Review of system essentially normal      Vitals:    07/16/25 1031   BP: 116/62   BP Location: Left arm   Patient Position: Sitting   Pulse: 82   Weight: 57.5 kg (126 lb 12.8 oz)   Height: 1.727 m (5' 8\")        Objective   Physical Exam  Constitutional:       Appearance: Normal appearance.   HENT:      Nose: Nose normal.   Neck:      Vascular: No carotid bruit.     Cardiovascular:      Rate and Rhythm: Normal rate. Rhythm irregularly irregular.      Pulses: Normal pulses.      Heart sounds: Normal heart sounds.   Pulmonary:      Effort: Pulmonary effort is normal.   Abdominal:      General: Bowel sounds are normal.      Palpations: Abdomen is soft.     Musculoskeletal:         General: Normal range of motion.      Cervical back: Normal range of motion.      Right lower leg: No edema.      Left lower leg: No edema.     Skin:     General: Skin is warm and dry.     Neurological:      General: No focal deficit present.      Mental Status: She is alert.     Psychiatric:         Mood and Affect: Mood normal.         Behavior: Behavior normal.         Thought Content: Thought content normal.         Judgment: Judgment normal.         Allergies  Patient has no known allergies.     Current Medications  Current Outpatient Medications   Medication Instructions   • albuterol 2.5 mg, Every 6 hours PRN   • amLODIPine (NORVASC) 5 mg, oral, Daily   • Anoro Ellipta 62.5-25 mcg/actuation blister with inhaler device 1 puff, Daily   • calcium carbonate-vitamin D3 500 mg-5 mcg (200 unit) tablet 1 tablet, 2 times daily   • " denosumab (Prolia) 60 mg/mL syringe Every 6 months   • digoxin (LANOXIN) 125 mcg, oral, Daily   • fluticasone (Flonase) 50 mcg/actuation nasal spray 1 spray, Daily PRN   • ipratropium (ATROVENT) 0.5 mg, 2 times daily PRN   • metoprolol succinate XL (TOPROL-XL) 25 mg, oral, Daily   • montelukast (Singulair) 10 mg tablet 1 tablet, Daily   • nitroglycerin (NITROSTAT) 0.4 mg, Every 5 min PRN   • predniSONE (Deltasone) 5 mg tablet 1 tablet, Daily   • thiamine (VITAMIN B-1) 250 mg, Daily   • valsartan (DIOVAN) 80 mg, oral, 2 times daily   • warfarin (COUMADIN) 4 mg                        Assessment/Plan   1. Permanent atrial fibrillation (Multi)  Follow Up In Cardiology    CBC    Basic Metabolic Panel    Follow Up In Cardiology    CBC    Basic Metabolic Panel      2. High risk medication use  CBC    Basic Metabolic Panel    CBC    Basic Metabolic Panel      3. Anticoagulated        4. Essential hypertension  valsartan (Diovan) 80 mg tablet    metoprolol succinate XL (Toprol-XL) 25 mg 24 hr tablet    amLODIPine (Norvasc) 5 mg tablet      5. Paroxysmal ventricular tachycardia        6. Chronic obstructive pulmonary disease, unspecified COPD type (Multi)        7. Former smoker        8. Body mass index (BMI) 19.9 or less, adult                 Scribe Attestation  By signing my name below, Siri AHUJA LPN, Scribe   attest that this documentation has been prepared under the direction and in the presence of Joe Hobbs MD.     Provider Attestation - Scribe documentation    All medical record entries made by the Scribe were at my direction and personally dictated by me. I have reviewed the chart and agree that the record accurately reflects my personal performance of the history, physical exam, discussion and plan.

## 2025-07-16 NOTE — PATIENT INSTRUCTIONS
Please bring all medicines, vitamins, and herbal supplements with you when you come to the office.    Prescriptions will not be filled unless you are compliant with your follow up appointments or have a follow up appointment scheduled as per instruction of your physician. Refills should be requested at the time of your visit.     BMI was below normal measurement. Current weight: 57.5 kg (126 lb 12.8 oz)  Weight change since last visit (-) denotes wt loss -5.2 lbs   Weight gain needed to achieve  BMI of 18.5 = 5.4 Lbs    Provided dietary education for weight gain to the patient.

## 2025-07-16 NOTE — PROGRESS NOTES
HPI  Patient is in the office for follow-up for permanent atrial fibrillation which has been treated with rate control and anticoagulation with warfarin.  The patient has had no cardiac events since her last visit, her INR has been checked periodically at the hospital and has been therapeutic for the most part.  Her last blood work from October of last year which I reviewed with her [facility] metabolic profile and CBC and she is due for repeat testing.  She reports no bleeding complications or thromboembolic events.  She sees pulmonary medicine at the [facility] and her asthma or reactive airway disease has been stable.  She is on inhaler therapy and prednisone as well.  Her examination today was essentially remarkable for irregular rhythm outside of that everything was normal.    Assessment/recommendation:     1-history of syncope leading to injury, patient declined invasive evaluation with no recurrences. This happened over 4 year ago. Patient has no indication of organic heart disease.  2-essential hypertension currently under excellent control on medical regimen that consists of amlodipine, metoprolol and valsartan that she has tolerated fairly well, she follows low-salt diet has ideal body weight and has been compliant with medical therapy.  Patient is due for basic metabolic profile which is ordered she is known to have normal kidney function  3-permanent atrial fibrillation managed with rate control and anticoagulation with warfarin, no thromboembolic events and no bleeding complications, patient is due for CBC which is ordered  4-high-risk medication with anticoagulants with no bleeding complications  5-mild COPD on medical therapy, patient follow-up with pulmonary medicine at the [facility] currently on small dose of steroids  ROS   Review of system essentially normal      Vitals:    07/16/25 1031   BP: 116/62   BP Location: Left arm   Patient Position: Sitting   Pulse: 82   Weight: 57.5 kg  "(126 lb 12.8 oz)   Height: 1.727 m (5' 8\")        Objective   Physical Exam  Constitutional:       Appearance: Normal appearance.   HENT:      Nose: Nose normal.   Neck:      Vascular: No carotid bruit.     Cardiovascular:      Rate and Rhythm: Normal rate. Rhythm irregularly irregular.      Pulses: Normal pulses.      Heart sounds: Normal heart sounds.   Pulmonary:      Effort: Pulmonary effort is normal.   Abdominal:      General: Bowel sounds are normal.      Palpations: Abdomen is soft.     Musculoskeletal:         General: Normal range of motion.      Cervical back: Normal range of motion.      Right lower leg: No edema.      Left lower leg: No edema.     Skin:     General: Skin is warm and dry.     Neurological:      General: No focal deficit present.      Mental Status: She is alert.     Psychiatric:         Mood and Affect: Mood normal.         Behavior: Behavior normal.         Thought Content: Thought content normal.         Judgment: Judgment normal.         Allergies  Patient has no known allergies.     Current Medications  Current Outpatient Medications   Medication Instructions    albuterol 2.5 mg, Every 6 hours PRN    amLODIPine (NORVASC) 5 mg, oral, Daily    Anoro Ellipta 62.5-25 mcg/actuation blister with inhaler device 1 puff, Daily    calcium carbonate-vitamin D3 500 mg-5 mcg (200 unit) tablet 1 tablet, 2 times daily    denosumab (Prolia) 60 mg/mL syringe Every 6 months    digoxin (LANOXIN) 125 mcg, oral, Daily    fluticasone (Flonase) 50 mcg/actuation nasal spray 1 spray, Daily PRN    ipratropium (ATROVENT) 0.5 mg, 2 times daily PRN    metoprolol succinate XL (TOPROL-XL) 25 mg, oral, Daily    montelukast (Singulair) 10 mg tablet 1 tablet, Daily    nitroglycerin (NITROSTAT) 0.4 mg, Every 5 min PRN    predniSONE (Deltasone) 5 mg tablet 1 tablet, Daily    thiamine (VITAMIN B-1) 250 mg, Daily    valsartan (DIOVAN) 80 mg, oral, 2 times daily    warfarin (COUMADIN) 4 mg            "             Assessment/Plan   1. Permanent atrial fibrillation (Multi)  Follow Up In Cardiology    CBC    Basic Metabolic Panel    Follow Up In Cardiology    CBC    Basic Metabolic Panel      2. High risk medication use  CBC    Basic Metabolic Panel    CBC    Basic Metabolic Panel      3. Anticoagulated        4. Essential hypertension  valsartan (Diovan) 80 mg tablet    metoprolol succinate XL (Toprol-XL) 25 mg 24 hr tablet    amLODIPine (Norvasc) 5 mg tablet      5. Paroxysmal ventricular tachycardia        6. Chronic obstructive pulmonary disease, unspecified COPD type (Multi)        7. Former smoker        8. Body mass index (BMI) 19.9 or less, adult                 Scribe Attestation  By signing my name below, Siri AHUJA LPN, Scribe   attest that this documentation has been prepared under the direction and in the presence of Joe Hobbs MD.     Provider Attestation - Scribe documentation    All medical record entries made by the Scribe were at my direction and personally dictated by me. I have reviewed the chart and agree that the record accurately reflects my personal performance of the history, physical exam, discussion and plan.

## 2026-02-26 ENCOUNTER — APPOINTMENT (OUTPATIENT)
Dept: CARDIOLOGY | Facility: CLINIC | Age: 81
End: 2026-02-26
Payer: MEDICARE